# Patient Record
Sex: MALE | Race: WHITE | URBAN - METROPOLITAN AREA
[De-identification: names, ages, dates, MRNs, and addresses within clinical notes are randomized per-mention and may not be internally consistent; named-entity substitution may affect disease eponyms.]

---

## 2018-12-13 ENCOUNTER — OFFICE VISIT - RIVER FALLS (OUTPATIENT)
Dept: FAMILY MEDICINE | Facility: CLINIC | Age: 38
End: 2018-12-13

## 2018-12-13 ASSESSMENT — MIFFLIN-ST. JEOR: SCORE: 1834.32

## 2019-12-13 ENCOUNTER — COMMUNICATION - RIVER FALLS (OUTPATIENT)
Dept: FAMILY MEDICINE | Facility: CLINIC | Age: 39
End: 2019-12-13

## 2020-09-21 ENCOUNTER — COMMUNICATION - RIVER FALLS (OUTPATIENT)
Dept: FAMILY MEDICINE | Facility: CLINIC | Age: 40
End: 2020-09-21

## 2020-09-22 ENCOUNTER — COMMUNICATION - RIVER FALLS (OUTPATIENT)
Dept: FAMILY MEDICINE | Facility: CLINIC | Age: 40
End: 2020-09-22

## 2022-02-11 VITALS
BODY MASS INDEX: 28 KG/M2 | SYSTOLIC BLOOD PRESSURE: 120 MMHG | DIASTOLIC BLOOD PRESSURE: 76 MMHG | HEART RATE: 68 BPM | HEIGHT: 71 IN | WEIGHT: 200 LBS | OXYGEN SATURATION: 99 %

## 2022-02-15 NOTE — TELEPHONE ENCOUNTER
Entered by Rodrigo Lynn CMA on September 21, 2020 9:25:01 AM CDT  PCP:   S[facility]    Medication:   valacyclovir  Last Filled:  3/17/20    Quantity:  30  Refills:  0    Date of last office visit and reason:   12/13/18 cold sores    Date of last Med Check / Px:     Date of last labs pertaining to condition:      Note:  Please advise on refills.  Pt has not been seen since 2018.   SHANTI is OC this week and SUNNY is listed as pt PCP.  Rodrigo Lynn CMA    Return to Clinic order placed?  yes pt overdue    Resource:   _  Phone:   _  Fax:  _            ** Patient matched by Rodrigo Lynn CMA on 9/21/2020 9:21:45 AM CDT **      ------------------------------------------  From: [facility]  To: Calvin Em PA-C  Sent: September 18, 2020 1:43:39 PM CDT  Subject: Medication Management  Due: September 9, 2020 4:21:06 PM CDT     ** On Hold Pending Signature **     Drug: valACYclovir (valACYclovir 1 g oral tablet), TAKE TWO (2) TABLETS TWICE DAILY FOR ONE (1) DAY-COLD SORES  Quantity: 30 tab(s)  Days Supply: 8  Refills: 0  Substitutions Allowed  Notes from Pharmacy:     Dispensed Drug: valACYclovir (valACYclovir 1 g oral tablet), TAKE TWO (2) TABLETS TWICE DAILY FOR ONE (1) DAY-COLD SORES  Quantity: 30 tab(s)  Days Supply: 8  Refills: 0  Substitutions Allowed  Notes from Pharmacy:  ---------------------------------------------------------------  From: Rodrigo Lynn CMA (eRx Pool ([id number]_[facility]))   To: [facility] Message Pool (32224_[place] [facility] [place]);     Sent: 9/21/2020 9:25:08 AM CDT  Subject: FW: Medication Management   Due Date/Time: 9/19/2020 1:43:00 PM CDT---------------------  From: Shantel Penaloza LPN   To: [facility]    Sent: 9/21/2020 9:34:01 AM CDT  Subject: FW: Medication Management     ** Not Approved: Patient needs appointment **  valACYclovir (VALACYCLOVIR 1GM)  TAKE TWO (2) TABLETS TWICE DAILY FOR ONE (1) DAY-COLD SORES  Qty:  30 tab(s)        Days Supply:  8        Refills:  0           Substitutions Allowed     Route To Pharmacy - [facility]   Signed by Shantel Penaloza LPN

## 2022-02-15 NOTE — TELEPHONE ENCOUNTER
Entered by Lucy Ford CMA on September 22, 2020 9:25:22 AM CDT  ---------------------  From: Lucy Ford CMA   To: [facility]    Sent: 9/22/2020 9:25:18 AM CDT  Subject: Medication Management     ** Not Approved: Patient needs appointment, has not been seen since 2018 **  valACYclovir (VALACYCLOVIR 1GM)  TAKE TWO (2) TABLETS TWICE DAILY FOR ONE (1) DAY-COLD SORES  Qty:  30 tab(s)        Days Supply:  8        Refills:  0          Substitutions Allowed     Route To Pharmacy - [facility]   Note from Pharmacy:  * * N O T I C E * * PRESCRIPTION PREVIOUSLY AUTHORIZED BY DOCTOR:CALVIN GAMING [phone]* * *  Signed by Lucy Ford CMA            ** Patient matched by Lucy Ford CMA on 9/22/2020 9:24:32 AM CDT **      ------------------------------------------  From: [facility]  To: Calvin Reese MD  Sent: September 22, 2020 9:14:09 AM CDT  Subject: Medication Management  Due: September 9, 2020 4:20:39 PM CDT     ** On Hold Pending Signature **     Drug: valACYclovir (valACYclovir 1 g oral tablet), TAKE TWO (2) TABLETS TWICE DAILY FOR ONE (1) DAY-COLD SORES  Quantity: 30 tab(s)  Days Supply: 8  Refills: 0  Substitutions Allowed  Notes from Pharmacy:     Dispensed Drug: valACYclovir (valACYclovir 1 g oral tablet), TAKE TWO (2) TABLETS TWICE DAILY FOR ONE (1) DAY-COLD SORES  Quantity: 30 tab(s)  Days Supply: 8  Refills: 0  Substitutions Allowed  Notes from Pharmacy: * * N O T I C E * * PRESCRIPTION PREVIOUSLY AUTHORIZED BY DOCTOR:CALVIN GAMING [phone] * *  ------------------------------------------

## 2022-02-15 NOTE — TELEPHONE ENCOUNTER
---------------------  From: Celena Lei CMA (eRx Pool ([id number]_[facility]))   To: [facility] Message Pool ([id number]_[facility]);     Sent: 12/13/2019 6:54:32 AM CST  Subject: FW: Medication Management   Due Date/Time: 12/13/2019 3:58:00 PM CST     Medication Refill needing approval    PCP:   SUNNY (saw [facility] for last med check)    Medication:   Valtrex  Last Filled:  12/13/18    Quantity:  30  Refills:  3  CSA on file?   no     Date of last office visit and reason:   12/13/18; cold sores  Date of last labs pertaining to condition:  11/16/16    Return to Clinic order placed?  yes; due now (RTC placed today)          ** Patient matched by Celena Lei CMA on 12/13/2019 6:52:06 AM CST **      ------------------------------------------  From: [facility]  To: Calvin Em PA-C  Sent: December 12, 2019 3:58:45 PM CST  Subject: Medication Management  Due: December 13, 2019 3:58:45 PM CST    ** On Hold Pending Signature **  Drug: valACYclovir (valACYclovir 1 g oral tablet)  TAKE TWO (2) TABLETS TWICE DAILY FOR ONE (1) DAY-COLD SORES  Quantity: 30 tab(s)  Days Supply: 8  Refills: 2  Substitutions Allowed  Notes from Pharmacy: Generic For:VALTREX 1GM    Dispensed Drug: valACYclovir (valACYclovir 1 g oral tablet)  TAKE TWO (2) TABLETS TWICE DAILY FOR ONE (1) DAY-COLD SORES  Quantity: 30 tab(s)  Days Supply: 8  Refills: 2  Substitutions Allowed  Notes from Pharmacy: Generic For:VALTREX 1GM  ---------------------------------------------------------------  From: Mayra Ferrer CMA ([facility] Message Pool ([id number]_[facility])   To: Calvin Em PA-C;     Sent: 12/13/2019 8:32:08 AM CST  Subject: FW: Medication Management   Due Date/Time: 12/13/2019 3:58:00 PM CST---------------------  From: Calvin Em PA-C   To: [facility]    Sent: 12/13/2019 8:53:49 AM CST  Subject: FW: Medication Management     ** Submitted: **  Complete:valACYclovir (Valtrex 1 g oral tablet)   Signed by Calvin Em PA-C  12/13/2019  8:53:00 AM    ** Approved with modifications: **  valACYclovir (VALACYCLOVIR 1GM)  TAKE TWO (2) TABLETS TWICE DAILY FOR ONE (1) DAY-COLD SORES  Qty:  30 tab(s)        Days Supply:  8        Refills:  0          Substitutions Allowed     Route To Pharmacy - [facility]   Note from Pharmacy:  Generic For:VALTREX 1GM

## 2022-02-15 NOTE — PROGRESS NOTES
Patient:   JENNIFER BELL            MRN: [id number]            FIN: [id number]               Age:   38 years     Sex:  Male     :  1980   Associated Diagnoses:   Herpes labialis   Author:   Calvin Em PA-C      Chief Complaint   2018 3:59 PM CST   Pt here for refill of Valtrex        History of Present Illness   Chief complaint and symptoms noted above and confirmed with patient   he gets outbreaks of cold sores about 6-8 times/year, usually associated with stress  cold sore stated this am on right upper lip, used Abreva      Review of Systems   Constitutional:  Negative.    Ear/Nose/Mouth/Throat:  Negative.    Respiratory:  Negative.    Cardiovascular:  Negative.    Gastrointestinal:  Negative.       Health Status   Allergies:    Allergic Reactions (Selected)  No known allergies   Medications:  (Selected)   Prescriptions  Prescribed  Valtrex 1 g oral tablet: 1 tab(s) ( 1 gm ), PO, BID, # 20 tab(s), 2 Refill(s), Type: Maintenance, Pharmacy: [facility], 1 tab(s) po bid   Problem list:    All Problems  Herpes labialis / ICD-9-.2 / Confirmed  Carpal tunnel syndrome / SNOMED CT 0835907114 / Confirmed  HLD (hyperlipidemia) / SNOMED CT 123964043 / Confirmed      Histories   Past Medical History:    No active or resolved past medical history items have been selected or recorded.   Family History:    Skin cancer  Mother     Procedure history:    Carpal tunnel decompression (9138754399) in 2016 at 36 Years.  Vasectomy (73359494) in  at 30 Years.  scrotal trauma in  at 17 Years.   Social History:        Alcohol Assessment            Current, Beer (12 oz), 1-2 times per month      Tobacco Assessment: Past            Current, Snuff      Substance Abuse Assessment            Never      Employment and Education Assessment            Employed      Home and Environment Assessment            Marital status:  (Living together).  Lives with Self, Children, Spouse.  Living situation:                Home/Independent.      Physical Examination   Vital Signs   12/13/2018 3:59 PM CST Peripheral Pulse Rate 68 bpm    Systolic Blood Pressure 120 mmHg    Diastolic Blood Pressure 76 mmHg    Mean Arterial Pressure 91 mmHg    BP Site Right arm    Oxygen Saturation 99 %      Measurements from flowsheet : Measurements   12/13/2018 3:59 PM CST Height Measured - Standard 71 in    Weight Measured - Standard 200 lb    BSA 2.13 m2    Body Mass Index 27.89 kg/m2  HI      General:  No acute distress.    HENT:  cold sore on right upper lip.    Respiratory:  Lungs are clear to auscultation.    Cardiovascular:  Normal rate, Regular rhythm, No murmur.    Psychiatric:  Appropriate mood & affect.       Impression and Plan   Diagnosis     Herpes labialis (TQV90-NV B00.1).     Summary:  will renew his valtrex for a year.    Orders     Orders   Pharmacy:  Valtrex 1 g oral tablet (Prescribe): See Instructions, Instructions: 2 tabs bid for one day at onset cold sore, # 30 EA, 2 Refill(s), Type: Maintenance, Pharmacy: [facility], 2 tabs bid for one day at onset cold sore.     Orders   Charges (Evaluation and Management):  [id number] office outpatient visit 15 minutes (Charge) (Order): Quantity: 1, Herpes labialis.

## 2022-02-15 NOTE — TELEPHONE ENCOUNTER
---------------------  From: Rupal Rodriguez   To: Lucy Ford CMA;     Sent: 3/17/2020 4:06:49 PM CDT  Subject: General Phone Call     Caller is:  ( x ) Patient   (  ) Mother   (  ) Father   ( ) Pharmacy    (  ) Other:      Patient's Physician:  WAYNE              Chief Complaint:      MED REFILL - VALACYCLOVIR         Advice/Action:                         (  ) OK to leave message on voice mail  (  ) Patient told to expect return call:   (  ) today   (  ) tomorrow   (  ) next work day  (  ) Patient's email  (  ) Other:    Call back phone # now:       until:    Call back phone # later: